# Patient Record
Sex: MALE | URBAN - METROPOLITAN AREA
[De-identification: names, ages, dates, MRNs, and addresses within clinical notes are randomized per-mention and may not be internally consistent; named-entity substitution may affect disease eponyms.]

---

## 2018-06-15 ENCOUNTER — TELEPHONE (OUTPATIENT)
Dept: RESEARCH | Facility: CLINIC | Age: 53
End: 2018-06-15

## 2018-06-15 DIAGNOSIS — Z52.001 STEM CELL DONOR: Primary | ICD-10-CM

## 2018-06-15 NOTE — PROGRESS NOTES
[id number]: Donor Telephone Encounter Note for Pre-screen verbal consent     Riley Camargo is a potential donor for research protocol [id number].  He was contacted today by the Clinical Research Associate for the study to verbally consent to the pre-screening consent form.  Telephone/Verbal Consent  The IRB-approved pre-screening donor consent form and the HIPAA consent were both read over the phone to the patient and all questions were answered before asking the patient to verbally consent. Two copies of the pre-screening informed consent and two copies of the HIPAA consent have been mailed to the patient. The patient has been instructed to sign one of the copies for both consents and return them via mail to the [facility]'s [facility]ational Therapy Lab, where it will then be routed back to the [facility].  Date of Verbal Consent: 06/15/18      Version Date: 12/12/2017  HIPAA Consent Discussed: Yes        HIPAA Version Date: 05/03/2017  Verbal Consent Obtained by: KRIS PIERCE    CLINICAL DATA:  IRB # [id number]   PI Name: Jennifer Velasquez  PI Phone: [phone]   : Haylee Carpio     Phone: [phone]      Pager: [phone]    Dates of Participation: 06/15/18  to Date of Apheresis TBD  Clinical Trial Name: Open Label Dose Escalation Trial of an Adaptive Natural Killer (NK) Cell Infusion (FATE-) with Subcutaneous IL-2 in Adults with Refractory or Relapsed Acute Myelogenous Leukemia (AML)  Clinical Trial Sponsor: [employer]

## 2018-06-19 ENCOUNTER — RESULTS ONLY (OUTPATIENT)
Dept: LAB | Age: 53
End: 2018-06-19

## 2018-06-20 ENCOUNTER — RESEARCH ENCOUNTER (OUTPATIENT)
Dept: RESEARCH | Facility: CLINIC | Age: 53
End: 2018-06-20

## 2018-06-20 LAB — CMV IGG SERPL QL IA: <0.2 AI (ref 0–0.8)

## 2018-06-21 NOTE — PROGRESS NOTES
Study [id number]    Clinical Data:   IRB # [id number]  PI Name: Jennifer Velasquez MD Phone: [phone] PI Pager: [phone]  : Haylee Carpio  Phone: [phone]  Pager: [phone]  Dates of Participation: 06/19/2018  to Date of Apheresis TBD  Clinical Trial Name:  [id number] Open Label Dose Escalation Trial of an Adaptive Natural Killer (NK) Cell Infusion (FATE-) with Subcutaneous IL-2 in Adults with Refractory or Relapsed Acute Myelogenous Leukemia (AML)    Clinical Trial Sponsor: [employer]      The pre-screening consent, HIPAA and blood samples were received by the Translational Therapy Lab 06/20/18. I walked over to [facility] to obtain the consents and delivered the blood samples to the [facility] for processing for HLA typing and CMVIGG.    I confirmed that the pre-screening and HIPAA consents were signed and dated by Riley Camargo.     Date of Consent: 06/19/2018      Version Date: 12/12/2017  Date of HIPAA Consent: 06/19/2018        HIPAA Version Date: 05/03/2017    Consent obtained by: KRIS PIERCE    Please refer to the telephone encounter note on 06/15/2018 for details of the verbal consent.

## 2021-03-07 ENCOUNTER — HOSPITAL ENCOUNTER (EMERGENCY)
Dept: HOSPITAL 41 - JD.ED | Age: 56
Discharge: HOME | End: 2021-03-07
Payer: COMMERCIAL

## 2021-03-07 DIAGNOSIS — Z79.899: ICD-10-CM

## 2021-03-07 DIAGNOSIS — R10.32: Primary | ICD-10-CM

## 2021-03-07 DIAGNOSIS — I10: ICD-10-CM

## 2021-03-07 PROCEDURE — 74019 RADEX ABDOMEN 2 VIEWS: CPT

## 2021-03-07 PROCEDURE — 36415 COLL VENOUS BLD VENIPUNCTURE: CPT

## 2021-03-07 PROCEDURE — 99284 EMERGENCY DEPT VISIT MOD MDM: CPT

## 2021-03-07 PROCEDURE — 74177 CT ABD & PELVIS W/CONTRAST: CPT

## 2021-03-07 PROCEDURE — 81001 URINALYSIS AUTO W/SCOPE: CPT

## 2021-03-07 PROCEDURE — 86140 C-REACTIVE PROTEIN: CPT

## 2021-03-07 PROCEDURE — 85025 COMPLETE CBC W/AUTO DIFF WBC: CPT

## 2021-03-07 PROCEDURE — 80053 COMPREHEN METABOLIC PANEL: CPT

## 2021-03-07 NOTE — EDM.PDOC
ED HPI GENERAL MEDICAL PROBLEM





- General


Chief Complaint: Abdominal Pain


Stated Complaint: ABDOMINAL PAINS


Time Seen by Provider: 03/07/21 19:34


Source of Information: Reports: Patient, RN Notes Reviewed


History Limitations: Reports: No Limitations





- History of Present Illness


INITIAL COMMENTS - FREE TEXT/NARRATIVE: 





Patient is a 56-year-old male presenting to the emergency department with 

complaints of left lower quadrant abdominal pain.  This pain has been occurring 

off and on for approximately last month.  He believes it could be related to 

constipation.  In the past, he has used Ex-Lax and the pain has improved after 

taking that medication.  He states that Friday Saturday this week, he felt 

fairly good.  Today the left lower quadrant abdominal pain return, however he 

did have a normal bowel movement today.  He denies any fever, chills, nausea, 

vomiting, or diarrhea.  He has had a colonoscopy in the past and does not 

remember being told that he has diverticulosis.  He is scheduled to have a 

repeat colonoscopy done next week.  Denies any previous abdominal surgeries.


  ** Left Lower Abdomen


Pain Score (Numeric/FACES): 5





- Related Data


                                    Allergies











Allergy/AdvReac Type Severity Reaction Status Date / Time


 


No Known Allergies Allergy   Verified 03/07/21 19:33











Home Meds: 


                                    Home Meds





Lisinopril/Hydrochlorothiazide [Lisinopril-Hctz 20-25 mg Tab] 1 tab PO DAILY 

03/07/21 [History]











Past Medical History


Cardiovascular History: Reports: Hypertension





- Infectious Disease History


Infectious Disease History: Reports: Chicken Pox





- Past Surgical History


HEENT Surgical History: Reports: Naso-Sinus Surgery





Social & Family History





- Family History


Family Medical History: No Pertinent Family History





- Tobacco Use


Tobacco Use Status *Q: Never Tobacco User





- Caffeine Use


Caffeine Use: Reports: Coffee, Soda





- Recreational Drug Use


Recreational Drug Use: No





ED ROS GENERAL





- Review of Systems


Review Of Systems: See Below


Constitutional: Reports: No Symptoms


HEENT: Reports: No Symptoms


Respiratory: Reports: No Symptoms


Cardiovascular: Reports: No Symptoms


Endocrine: Reports: No Symptoms


GI/Abdominal: Reports: Abdominal Pain (LLQ), Constipation.  Denies: Diarrhea, 

Nausea, Vomiting


: Reports: Dysuria.  Denies: Flank Pain


Musculoskeletal: Reports: No Symptoms


Skin: Reports: No Symptoms


Neurological: Reports: No Symptoms


Psychiatric: Reports: No Symptoms


Hematologic/Lymphatic: Reports: No Symptoms


Immunologic: Reports: No Symptoms





ED EXAM, GI/ABD





- Physical Exam


Exam: See Below


General Appearance: Alert, WD/WN, No Apparent Distress


Respiratory/Chest: No Respiratory Distress, Lungs Clear, Normal Breath Sounds, 

No Accessory Muscle Use, Chest Non-Tender


Cardiovascular: Normal Peripheral Pulses, Regular Rate, Rhythm, No Edema, No 

Gallop, No JVD, No Murmur, No Rub


GI/Abdominal Exam: Normal Bowel Sounds, Soft, No Organomegaly, No Distention, No

 Abnormal Bruit, No Mass, Pelvis Stable, Tender (LLQ)


Neurological: Alert, Oriented, CN II-XII Intact, Normal Cognition, Normal Gait, 

Normal Reflexes, No Motor/Sensory Deficits


Psychiatric: Normal Affect, Normal Mood


Skin Exam: Warm, Dry, Intact, Normal Color, No Rash





Course





- Vital Signs


Last Recorded V/S: 


                                Last Vital Signs











Temp  98.2 F   03/07/21 19:31


 


Pulse  55 L  03/07/21 19:31


 


Resp  17   03/07/21 19:31


 


BP  138/88   03/07/21 19:31


 


Pulse Ox  98   03/07/21 19:31














- Orders/Labs/Meds


Labs: 


                                Laboratory Tests











  03/07/21 03/07/21 03/07/21 Range/Units





  20:04 20:04 20:15 


 


WBC  8.14    (4.23-9.07)  K/mm3


 


RBC  5.21    (4.63-6.08)  M/mm3


 


Hgb  15.2    (13.7-17.5)  gm/dl


 


Hct  46.3    (40.1-51.0)  %


 


MCV  88.9    (79.0-92.2)  fl


 


MCH  29.2    (25.7-32.2)  pg


 


MCHC  32.8    (32.2-35.5)  g/dl


 


RDW Std Deviation  39.5    (35.1-43.9)  fL


 


Plt Count  277    (163-337)  K/mm3


 


MPV  10.3    (9.4-12.3)  fl


 


Neut % (Auto)  72.7 H    (34.0-67.9)  %


 


Lymph % (Auto)  17.2 L    (21.8-53.1)  %


 


Mono % (Auto)  8.7    (5.3-12.2)  %


 


Eos % (Auto)  1.1    (0.8-7.0)  


 


Baso % (Auto)  0.1    (0.1-1.2)  %


 


Neut # (Auto)  5.91 H    (1.78-5.38)  K/mm3


 


Lymph # (Auto)  1.40    (1.32-3.57)  K/mm3


 


Mono # (Auto)  0.71    (0.30-0.82)  K/mm3


 


Eos # (Auto)  0.09    (0.04-0.54)  K/mm3


 


Baso # (Auto)  0.01    (0.01-0.08)  K/mm3


 


Sodium   141   (136-145)  mEq/L


 


Potassium   3.9   (3.5-5.1)  mEq/L


 


Chloride   105   ()  mEq/L


 


Carbon Dioxide   28   (21-32)  mEq/L


 


Anion Gap   11.9   (5-15)  


 


BUN   19 H   (7-18)  mg/dL


 


Creatinine   1.4 H   (0.7-1.3)  mg/dL


 


Est Cr Clr Drug Dosing   59.38   mL/min


 


Estimated GFR (MDRD)   52   (>60)  mL/min


 


BUN/Creatinine Ratio   13.6 L   (14-18)  


 


Glucose   102   ()  mg/dL


 


Calcium   9.7   (8.5-10.1)  mg/dL


 


Total Bilirubin   0.6   (0.2-1.0)  mg/dL


 


AST   25   (15-37)  U/L


 


ALT   42   (16-63)  U/L


 


Alkaline Phosphatase   49   ()  U/L


 


C-Reactive Protein   <0.2   (<1.0)  mg/dL


 


Total Protein   7.6   (6.4-8.2)  g/dl


 


Albumin   4.2   (3.4-5.0)  g/dl


 


Globulin   3.4   gm/dL


 


Albumin/Globulin Ratio   1.2   (1-2)  


 


Urine Color    Yellow  (Yellow)  


 


Urine Appearance    Clear  (Clear)  


 


Urine pH    5.5  (5.0-8.0)  


 


Ur Specific Gravity    > or = 1.030  (1.005-1.030)  


 


Urine Protein    Negative  (Negative)  


 


Urine Glucose (UA)    Negative  (Negative)  


 


Urine Ketones    Negative  (Negative)  


 


Urine Occult Blood    Negative  (Negative)  


 


Urine Nitrite    Negative  (Negative)  


 


Urine Bilirubin    Negative  (Negative)  


 


Urine Urobilinogen    0.2  (0.2-1.0)  


 


Ur Leukocyte Esterase    Negative  (Negative)  


 


Urine RBC    0-5  (0-5)  /hpf


 


Urine WBC    0-5  (0-5)  /hpf


 


Ur Squamous Epith Cells    0-5  (0-5)  /hpf


 


Urine Bacteria    Few  (FEW)  /hpf


 


Urine Mucus    Moderate H  (FEW)  /hpf











Meds: 


Medications














Discontinued Medications














Generic Name Dose Route Start Last Admin





  Trade Name Benjie  PRN Reason Stop Dose Admin


 


Sodium Chloride  100 mls @ 60 drops/min  03/07/21 22:00  03/07/21 22:33





  Normal Saline  IV   60 drops/min





  ASDIRECTED STEFFEN   Administration


 


Iopamidol  100 ml  03/07/21 21:49  03/07/21 22:32





  Isovue-300 (61%)  IVPUSH  03/07/21 21:50  100 ml





  ONETIME ONE   Administration


 


Sodium Chloride  10 ml  03/07/21 19:44  03/07/21 20:02





  Saline Flush  FLUSH   10 ml





  ASDIRECTED PRN   Administration





  Keep Vein Open  


 


Sodium Chloride  10 ml  03/07/21 22:00  03/07/21 22:33





  Saline Flush  FLUSH   10 ml





  BOLUS STEFFEN   Administration














- Re-Assessments/Exams


Free Text/Narrative Re-Assessment/Exam: 





03/07/21 21:01


Hematology was significant for BUN minimally elevated at 19, creatinine 1.4.  

Remaining work-up was unremarkable.  CRP and WBCs are normal.  Urinalysis was 

negative for any blood or signs of infection.  X-ray of the abdomen flat and 

upright shows a normal bowel gas pattern with no signs of constipation.  Given 

the patient's left lower quadrant tenderness, there is a possibility that he 

could have diverticulitis, although given his lab results is unlikely.  He does 

request to proceed with a CT scan of the abdomen pelvis at this time.  I have 

ordered abdomen pelvis CT with contrast to be completed.


03/07/21 22:45


The scan of the abdomen pelvis impression as follows


1.  No acute findings.


2.  Liver cyst and peripelvic cysts left kidney require no follow-up.


3.  Prostate is enlarged.


4.  A normal appendix is identified.


5.  Nonspecific/nonobstructive intestinal gas pattern.


6.  Small right renal calculus, no hydronephrosis.


Results discussed with patient and his wife.  Would recommend that he keeps his 

appointment for his colonoscopy as scheduled next Monday.  There were no acute 

findings found on his work-up today.  Discharge instructions as documented.





Departure





- Departure


Time of Disposition: 22:46


Disposition: Home, Self-Care 01


Condition: Good


Clinical Impression: 


Abdominal pain


Qualifiers:


 Abdominal location: left lower quadrant Qualified Code(s): R10.32 - Left lower 

quadrant pain








- Discharge Information


*PRESCRIPTION DRUG MONITORING PROGRAM REVIEWED*: No


*COPY OF PRESCRIPTION DRUG MONITORING REPORT IN PATIENT MELVIN: No


Instructions:  Abdominal Pain, Adult


Referrals: 


Derrick Stuart MD [Primary Care Provider] - 


Forms:  ED Department Discharge


Additional Instructions: 


You were seen in the emergency department today for intermittent left lower 

quadrant abdominal pain over the course the last month with worsening of 

symptoms today.  Work-up included blood work, urinalysis, and x-ray of your 

abdomen, and a CT scan of your abdomen and pelvis.  Results of your work-up were

found to be normal.  There is no evidence of diverticulitis or constipation.  

Recommend that you keep your appointment for colonoscopy as scheduled with your 

primary care provider next Monday.  If you should experience any new or 

worsening symptoms of concern, please do not hesitate to return to the emergency

department for reevaluation.





Sepsis Event Note (ED)





- Evaluation


Sepsis Screening Result: No Definite Risk

## 2021-03-08 NOTE — CR
Abdomen: Supine and upright views of the abdomen were obtained.

 

Comparison: No prior abdominal x-rays available.

 

Bowel gas pattern appears normal.  No free air is seen.  

Calcifications are seen within the lower pelvis which are felt 

compatible with phleboliths.  No discrete soft tissue abnormality is 

appreciated.  Bony structures are unremarkable.

 

Impression:

1.  Nothing acute is seen on 2 view abdominal x-ray.

 

Diagnostic code #2

## 2021-03-08 NOTE — CT
CT abdomen and pelvis

 

Technique: Multiple axial sections were obtained from above the dome 

of the diaphragm inferiorly through the pubic symphysis.  Delayed 

images were also obtained through the abdomen and pelvis.  

Reconstructed coronal and sagittal images were also obtained.

 

Comparison: Prior abdominal x-ray performed earlier on the same day 

(8:42 PM).

 

Findings: Two small nodules are seen within the right lower lung next 

to the major fissure.  Largest nodule measures approximately 3.8 mm.  

Small nodule adjacent to the pleura is noted within the left lung base

 measuring about 2.5 mm.  Nothing acute is otherwise seen.

 

Low density cyst is noted within the right lobe of the liver measuring

 approximately 1.1 cm.  Very minimal low density lesion is noted more 

superiorly within the right lobe of the liver measuring 4 mm which is 

too small to characterize by Hounsfield unit measurements but most 

likely represents an additional cyst.

 

Spleen shows no focal abnormality.  Small amount of accessory splenic 

tissue is noted off the inferior spleen.

 

Adrenal glands show no nodule.  No abnormality is appreciated within 

the spleen.  Gallbladder contains no calcified gallstones.

 

Small nonobstructing calculus is seen within the right kidney 

measuring around 4 mm.  Very minimal cyst is noted within the left 

kidney measuring 3 mm.  No additional renal abnormality is seen.  

Delayed images show contrast within the ureters.  No contrast is seen 

to extend into the bladder at this time.

 

Abdominal aorta shows no aneurysm.  No retroperitoneal adenopathy or 

mesenteric abnormalities are appreciated.  No pelvic mass or 

adenopathy is seen.  Prostate gland is mildly enlarged and shows 

intra-prostate calcifications.  Appendix is is seen and is normal in 

size.  No free fluid or inflammatory change is appreciated.

 

Bone window settings were reviewed.  Small limbus vertebra is noted at

 the anterior and superior endplate of L4.  No acute osseous 

abnormality is appreciated.

 

Impression:

1.  Small findings as noted above which are believed to be incidental.

2.  Small nodules within both lung bases.  Recommend follow-up 

noncontrast chest CT study in one year.

3.  Nothing acute is appreciated on CT study of the abdomen and 

pelvis.

 

Diagnostic code #3

 

I mostly agree with preliminary report from ad (additional finding 

of lung nodules), finalized on 03/07/21, 11:38 PM CST